# Patient Record
Sex: FEMALE | Race: WHITE | NOT HISPANIC OR LATINO | URBAN - METROPOLITAN AREA
[De-identification: names, ages, dates, MRNs, and addresses within clinical notes are randomized per-mention and may not be internally consistent; named-entity substitution may affect disease eponyms.]

---

## 2025-01-24 ENCOUNTER — OFFICE VISIT (OUTPATIENT)
Dept: ORTHOPEDIC SURGERY | Facility: CLINIC | Age: 60
End: 2025-01-24
Payer: MEDICARE

## 2025-01-24 ENCOUNTER — HOSPITAL ENCOUNTER (OUTPATIENT)
Dept: RADIOLOGY | Facility: CLINIC | Age: 60
Discharge: HOME | End: 2025-01-24
Payer: MEDICARE

## 2025-01-24 VITALS — BODY MASS INDEX: 34.15 KG/M2 | WEIGHT: 200 LBS | HEIGHT: 64 IN

## 2025-01-24 DIAGNOSIS — S69.92XS LEFT WRIST INJURY, SEQUELA: Primary | ICD-10-CM

## 2025-01-24 DIAGNOSIS — S52.515A NONDISPLACED FRACTURE OF LEFT RADIAL STYLOID PROCESS, INITIAL ENCOUNTER FOR CLOSED FRACTURE: ICD-10-CM

## 2025-01-24 DIAGNOSIS — S69.92XS LEFT WRIST INJURY, SEQUELA: ICD-10-CM

## 2025-01-24 PROCEDURE — 73110 X-RAY EXAM OF WRIST: CPT | Mod: LT

## 2025-01-24 PROCEDURE — 99213 OFFICE O/P EST LOW 20 MIN: CPT

## 2025-01-24 RX ORDER — IBUPROFEN 800 MG/1
800 TABLET ORAL 3 TIMES DAILY
Qty: 90 TABLET | Refills: 0 | Status: SHIPPED | OUTPATIENT
Start: 2025-01-24 | End: 2025-02-23

## 2025-01-24 ASSESSMENT — PAIN - FUNCTIONAL ASSESSMENT: PAIN_FUNCTIONAL_ASSESSMENT: 0-10

## 2025-01-24 ASSESSMENT — PAIN SCALES - GENERAL: PAINLEVEL_OUTOF10: 7

## 2025-01-24 NOTE — PROGRESS NOTES
History of Present Illness  Rita Shaffer is a 59 y.o.s female, accompanied by , presenting for Left wrist pain. Patient had a fall 7 days ago from walking boot when she slipped on ice and broke her fall with an out stretched hand. She was seen at the ER in [facility] and was placed into an arm splint.She endorses some pain and swelling on the dorsal radius controlled with tylenol and improved over initial injury. She Denies numbness, tingling, f/c, n/v, CP, SOB, or any other complaints/concerns. This is her nondominant hand.     Past Medical History:   Diagnosis Date    Anxiety disorder, unspecified 02/07/2018    Anxiety and depression    Personal history of (healed) traumatic fracture     History of cervical fracture    Personal history of other diseases of urinary system 02/07/2018    History of prolapse of bladder    Personal history of other medical treatment 05/25/2016    History of screening mammography       Medication Documentation Review Audit    **Prior to Admission medications have not yet been reviewed**         No Known Allergies    Social History     Socioeconomic History    Marital status:      Spouse name: Not on file    Number of children: Not on file    Years of education: Not on file    Highest education level: Not on file   Occupational History    Not on file   Tobacco Use    Smoking status: Not on file    Smokeless tobacco: Not on file   Substance and Sexual Activity    Alcohol use: Not on file    Drug use: Not on file    Sexual activity: Not on file   Other Topics Concern    Not on file   Social History Narrative    Not on file     Social Drivers of Health     Financial Resource Strain: Low Risk  (9/12/2023)    Received from [facility]    Overall Financial Resource Strain (CARDIA)     Difficulty of Paying Living Expenses: Not hard at all   Food Insecurity: Not on File (9/26/2024)    Received from [facility]    Food Insecurity     Food: 0   Transportation Needs: No Transportation  Needs (2024)    Received from [facility]    PRAPARE - Transportation     Lack of Transportation (Medical): No     Lack of Transportation (Non-Medical): No   Physical Activity: Not on File (2021)    Received from [facility]    Physical Activity     Physical Activity: 0   Stress: Not on File (3/28/2023)    Received from [facility]    Stress     Stress: 0   Social Connections: Not on File (3/28/2023)    Received from [facility]    Social Connections     Connectedness: 0   Intimate Partner Violence: Not on file   Housing Stability: Unknown (2024)    Received from [facility]    Housing Stability Vital Sign     Unable to Pay for Housing in the Last Year: No     Number of Places Lived in the Last Year: Not on file     Unstable Housing in the Last Year: No       Past Surgical History:   Procedure Laterality Date     SECTION, CLASSIC  2018     Section    HYSTERECTOMY  2018    Hysterectomy    OTHER SURGICAL HISTORY  2018    Repeat Lithotomy    OTHER SURGICAL HISTORY  2018    Laparoscopic Sling Operation For Stress Incontinence        Review of Systems   30 point ROS reviewed and negative other than as listed in the HPI.      Exam  Gen: The pt is A&Ox3, NAD, and appear state age and weight  Psychiatric: mood and affect are appropriate   Eyes: sclera are white, EOM grossly intact  ENT: MMM  Neck: supple, thyroid is midline  Respiratory: respirations are nonlabored, chest rise symmetric  CV: rate is regular by palpation of distal pulses  Abdomen: nondistended   Integument: no obvious cutaneous lesions noted. No signs of lymphangitis. No signs of systemic edema.   MSK: Hand/Wrist Musculoskeletal Exam    Inspection    Left      Edema: mild    Palpation    Left       Left hand palpation is normal.      Wrist tenderness to palpation: radial styloid    Range of Motion     Left Hand      Left hand range of motion is normal.        Left Wrist      Left wrist range of motion is normal.       Strength     Left Hand      Left hand strength is normal.        Neurovascular    Left       Left neurovascular exam is normal.         Imaging:  I personally reviewed multiple views of the left hand were obtained 01/24/2025 demonstrate left non-displaced radial styloid fracture.  No significant degenerative changes     Assessment:   left non-displaced radial styloid fracture    Plan:  We discussed conservative treatment with  cock-up wrist brace for 5-6 weeks, worn at all time, may be taken off to bathe or dressing. She should be NWB with the left wrist. Recommended RICE therapy, and ibuprofen supplemented with tylenol for pain   Prescription of ibuprofen sent to pharmacy.  Follow up in 5 weeks for new wrist Xrays    Natural history reviewed. All of the pt questions/concerns addressed and they are in agreement with the plan.

## 2025-01-29 ENCOUNTER — TELEPHONE (OUTPATIENT)
Dept: ORTHOPEDIC SURGERY | Facility: CLINIC | Age: 60
End: 2025-01-29
Payer: MEDICARE

## 2025-01-29 DIAGNOSIS — S52.515A NONDISPLACED FRACTURE OF LEFT RADIAL STYLOID PROCESS, INITIAL ENCOUNTER FOR CLOSED FRACTURE: Primary | ICD-10-CM

## 2025-01-29 RX ORDER — TRAMADOL HYDROCHLORIDE 50 MG/1
50 TABLET ORAL 3 TIMES DAILY PRN
Qty: 15 TABLET | Refills: 0 | Status: SHIPPED | OUTPATIENT
Start: 2025-01-29 | End: 2025-02-03

## 2025-01-29 NOTE — PROGRESS NOTES
Short course of tramadol for fracture pain.  Should take for extreme pain only.  Will not refill and should transition back to Tylenol and NSAIDs if she runs out.  Should not drive while taking or within 3 hours of her gabapentin.  Prescription sent to pharmacy, OARRS reviewed by myself prior to sending.

## 2025-01-29 NOTE — TELEPHONE ENCOUNTER
1/24/25 lt radial styloid fx  Patient is having a lot of pain and throbbing. The ibuprofen isn't touching the pain. Wants to know if we can call in something else? I let patient know typically we do not prescribe anything as far as pain meds unless they have had surgery. Let her know I will see if there is anything further that could help. Also she is alternating between ibuprofen and tylenol. Wants to know how long in between she should wait to take one another?    Pain level: 10  [facility]-JOSE Marroquin